# Patient Record
Sex: MALE | Race: BLACK OR AFRICAN AMERICAN | NOT HISPANIC OR LATINO | URBAN - METROPOLITAN AREA
[De-identification: names, ages, dates, MRNs, and addresses within clinical notes are randomized per-mention and may not be internally consistent; named-entity substitution may affect disease eponyms.]

---

## 2017-02-19 ENCOUNTER — EMERGENCY (EMERGENCY)
Facility: HOSPITAL | Age: 32
LOS: 1 days | Discharge: HOME | End: 2017-02-19
Attending: EMERGENCY MEDICINE | Admitting: EMERGENCY MEDICINE
Payer: SELF-PAY

## 2017-02-19 VITALS
HEART RATE: 117 BPM | OXYGEN SATURATION: 97 % | TEMPERATURE: 98 F | DIASTOLIC BLOOD PRESSURE: 105 MMHG | SYSTOLIC BLOOD PRESSURE: 190 MMHG | RESPIRATION RATE: 16 BRPM

## 2017-02-19 DIAGNOSIS — H10.213 ACUTE TOXIC CONJUNCTIVITIS, BILATERAL: ICD-10-CM

## 2017-02-19 DIAGNOSIS — H57.13 OCULAR PAIN, BILATERAL: ICD-10-CM

## 2017-02-19 PROCEDURE — 99053 MED SERV 10PM-8AM 24 HR FAC: CPT

## 2017-02-19 PROCEDURE — 99283 EMERGENCY DEPT VISIT LOW MDM: CPT | Mod: 25
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
## 2017-02-20 RX ORDER — IBUPROFEN 200 MG
600 TABLET ORAL ONCE
Qty: 0 | Refills: 0 | Status: COMPLETED | OUTPATIENT
Start: 2017-02-20 | End: 2017-02-20

## 2017-02-20 RX ADMIN — Medication 600 MILLIGRAM(S): at 00:42
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
## 2017-02-20 NOTE — ED PROVIDER NOTE - EYES, MLM
Slightly diffuse conjunctival injection B/L. Clear bilaterally, pupils equal, round and reactive to light. EOMI B/L. No hyphema.

## 2021-01-21 NOTE — ED PROVIDER NOTE - SKIN TURGOR
Referred by: Tyesha Nye DO; Medical Diagnosis (from order):    Diagnosis Information      Diagnosis    715.13 (ICD-9-CM) - M19.031, M19.032 (ICD-10-CM) - Primary osteoarthritis of both wrists                Occupational Therapy -  Daily Treatment Note    Visit:  4     SUBJECTIVE                                                                                                             Been pretty good since last session. Occasional clicking continues.   Functional Change: Compliance reported to HEP. No reportable change in day to day function.    Pain / Symptoms:  Pain rating (out of 10): Current: 2   Location: Bilateral wrists, slightly more L wrist and both ulnar dorsal zone with pain to lesser degree 1st dorsal.     OBJECTIVE                                                                                                                        TREATMENT                                                                                                                  Therapeutic Exercise:    Clinic strength   3lb curls x 15(2), scaption 2lb x 12(2)  Red flexbar x 15 x 10 pro/sup  Red digiflex x 10 [place] 3 hand position  UBE x 5  Doorway stretch to address shortening of anterior postural muscles.  2lb wrist Nirschl exercises x 12 extension / radial deviation. (minimal ulnar sided wrist pain reproduced with deviation              Manual Therapy:  L wrist mid carpal mob to improve wrist extension. General STM to radial wrist / digit extensors x 4 min    Skilled input: verbal instruction/cues    Writer verbally educated and received verbal consent for hand placement, positioning of patient, and techniques to be performed today from patient for clothing adjustments for techniques and hand placement and palpation for techniques as described above and how they are pertinent to the patient's plan of care.    Home Exercise Program: V1-tendon glides  V2-Pt education and completes intrinsic hand exercises (handout and  bands), Proximal t-band HA and bilateral ER (handouts and yellow band.)      Ultrasound (43708)  Location: bilateral wrist at midcarpal  Duty Cycle: 100%  Frequency: 1 Mhz  Intensity (w/cm2): 1.0  Duration: 10 minutes  Results: improved range of motion, tissue softening and improved circulation      ASSESSMENT                                                                                                             Improvements to wrist flexion L from 35 degrees to 45 degrees following intervention, R wrist 45 degrees pre and post treatment.  Continuing to address suboptimal range of motion at wrist, advise low load stretch vs forceful stretching. Pt understanding  Pain/symptoms after session: 2            Procedures and total treatment time documented Time Entry flowsheet.   resilient/elastic
